# Patient Record
Sex: FEMALE | Race: WHITE | NOT HISPANIC OR LATINO | URBAN - METROPOLITAN AREA
[De-identification: names, ages, dates, MRNs, and addresses within clinical notes are randomized per-mention and may not be internally consistent; named-entity substitution may affect disease eponyms.]

---

## 2021-03-19 ENCOUNTER — OFFICE VISIT (OUTPATIENT)
Dept: URBAN - METROPOLITAN AREA CLINIC 23 | Facility: CLINIC | Age: 66
End: 2021-03-19
Payer: MEDICARE

## 2021-03-19 DIAGNOSIS — Z12.11 SCREENING FOR COLON CANCER: ICD-10-CM

## 2021-03-19 DIAGNOSIS — Z83.71 FAMILY HISTORY OF COLONIC POLYPS: ICD-10-CM

## 2021-03-19 PROCEDURE — 99202 OFFICE O/P NEW SF 15 MIN: CPT | Performed by: INTERNAL MEDICINE

## 2021-03-19 RX ORDER — POLYETHYLENE GLYCOL 3350, SODIUM SULFATE ANHYDROUS, SODIUM BICARBONATE, SODIUM CHLORIDE, POTASSIUM CHLORIDE 236; 22.74; 6.74; 5.86; 2.97 G/4L; G/4L; G/4L; G/4L; G/4L
AS DIRECTED POWDER, FOR SOLUTION ORAL ONCE
Qty: 1 | Refills: 0 | OUTPATIENT
Start: 2021-03-19 | End: 2021-03-20

## 2021-03-19 NOTE — HPI-TODAY'S VISIT:
65-year-old female presented schedule her screening colonoscopy she denies any GI symptoms no nausea no vomiting no abdominal pain. Her father has large precancerous polyp was removed. She had a colonoscopy about 6 years ago. No blood in the stool no nausea no vomiting.

## 2021-05-13 ENCOUNTER — OFFICE VISIT (OUTPATIENT)
Dept: URBAN - METROPOLITAN AREA SURGERY CENTER 15 | Facility: SURGERY CENTER | Age: 66
End: 2021-05-13

## 2021-05-17 ENCOUNTER — OFFICE VISIT (OUTPATIENT)
Dept: URBAN - METROPOLITAN AREA SURGERY CENTER 15 | Facility: SURGERY CENTER | Age: 66
End: 2021-05-17
Payer: MEDICARE

## 2021-05-17 ENCOUNTER — TELEPHONE ENCOUNTER (OUTPATIENT)
Dept: URBAN - METROPOLITAN AREA CLINIC 92 | Facility: CLINIC | Age: 66
End: 2021-05-17

## 2021-05-17 ENCOUNTER — CLAIMS CREATED FROM THE CLAIM WINDOW (OUTPATIENT)
Dept: URBAN - METROPOLITAN AREA CLINIC 4 | Facility: CLINIC | Age: 66
End: 2021-05-17
Payer: MEDICARE

## 2021-05-17 DIAGNOSIS — Z12.11 COLON CANCER SCREENING: ICD-10-CM

## 2021-05-17 DIAGNOSIS — K63.89 STENOSIS OF ILEOCECAL VALVE: ICD-10-CM

## 2021-05-17 DIAGNOSIS — K63.5 BENIGN COLON POLYP: ICD-10-CM

## 2021-05-17 PROCEDURE — 45385 COLONOSCOPY W/LESION REMOVAL: CPT | Performed by: INTERNAL MEDICINE

## 2021-05-17 PROCEDURE — G8907 PT DOC NO EVENTS ON DISCHARG: HCPCS | Performed by: INTERNAL MEDICINE

## 2021-05-17 PROCEDURE — 88305 TISSUE EXAM BY PATHOLOGIST: CPT | Performed by: PATHOLOGY

## 2021-05-17 RX ORDER — LIDOCAINE 50 MG/G
MIX WITH DILTIAZEM 2.5% OINTMENT TOPICAL TWICE A DAY
Qty: 45 GRAM | Refills: 1 | OUTPATIENT
Start: 2021-05-17

## 2022-03-17 ENCOUNTER — TELEPHONE ENCOUNTER (OUTPATIENT)
Dept: URBAN - METROPOLITAN AREA CLINIC 23 | Facility: CLINIC | Age: 67
End: 2022-03-17

## 2024-03-20 ENCOUNTER — OV EP (OUTPATIENT)
Dept: URBAN - METROPOLITAN AREA CLINIC 111 | Facility: CLINIC | Age: 69
End: 2024-03-20
Payer: MEDICARE

## 2024-03-20 VITALS
DIASTOLIC BLOOD PRESSURE: 71 MMHG | BODY MASS INDEX: 24.83 KG/M2 | HEART RATE: 81 BPM | WEIGHT: 149 LBS | SYSTOLIC BLOOD PRESSURE: 101 MMHG | HEIGHT: 65 IN | TEMPERATURE: 97.7 F

## 2024-03-20 DIAGNOSIS — A04.72 C. DIFFICILE DIARRHEA: ICD-10-CM

## 2024-03-20 DIAGNOSIS — D32.9 MENINGIOMA: ICD-10-CM

## 2024-03-20 DIAGNOSIS — Z86.010 HISTORY OF COLON POLYPS: ICD-10-CM

## 2024-03-20 PROCEDURE — 99213 OFFICE O/P EST LOW 20 MIN: CPT | Performed by: INTERNAL MEDICINE

## 2024-03-20 RX ORDER — LIDOCAINE 50 MG/G
MIX WITH DILTIAZEM 2.5% OINTMENT TOPICAL TWICE A DAY
Qty: 45 GRAM | Refills: 1 | Status: ACTIVE | COMMUNITY
Start: 2021-05-17

## 2024-03-20 NOTE — HPI-TODAY'S VISIT:
68-year-old female presents today for follow-up after hospitalization at [facility] in December 22, 2023 for acute onset diarrhea dehydration she was diagnosed with C. difficile and E. coli colitis.  Treated with Flagyl and Dificid.  In the hospital found to have 2.5 cm calcified hemangioma.  She reported she followed with infectious disease Dr. Vasquez her diarrhea improved currently has 2 bowel movement a day she is on probiotic.  She had colonoscopy in 2021 revealed diverticulosis and small tubular adenoma She reports he had a C. diff infection in December, following a UTI in March and an E. coli infection contracted after a trip to [facility] in October.  , the patient notes an improvement in bowel movements, although they have not returned to their previous consistency. Probiotics have been part of her regimen since the hospitalization for C. diff and are still being taken. Additionally, the patient has a 15-year history of a benign meningioma located in the brain's lining. This condition is associated with headaches, but the most recent MRI indicates that the meningioma is calcified and has remained stable.

## 2024-03-20 NOTE — PHYSICAL EXAM GASTROINTESTINAL
Abdomen , soft, nontender, nondistended , no guarding or rigidity , no masses palpable , normal bowel sounds , Liver and Spleen,  no hepatosplenomegaly , liver nontender Writer spoke with Patti in endocrinology.  She said patient was referred to Medical Weight Management.  She said after reviewing, patient has a history swallowing items/self harm and a history of bulemia.  She said there is a note from the dietician with very strict directions to patient about things like not weighing herself, etc.  She said that Maulik just told her dietician the other day that she abuses laxatives and then told the endocrinology office that she doesn't.  She said that they do not feel is it safe for patient to see them for Medical Weight Management at this time.      Writer does not see a recent referral for this.  Patti reviewed patient's referrals and said that patient must have self referred then and that they will likely discharge her.

## 2024-03-22 PROBLEM — 126965008: Status: ACTIVE | Noted: 2024-03-22

## 2025-01-22 ENCOUNTER — OFFICE VISIT (OUTPATIENT)
Dept: URBAN - METROPOLITAN AREA CLINIC 111 | Facility: CLINIC | Age: 70
End: 2025-01-22

## 2025-03-12 ENCOUNTER — DASHBOARD ENCOUNTERS (OUTPATIENT)
Age: 70
End: 2025-03-12

## 2025-03-12 ENCOUNTER — LAB OUTSIDE AN ENCOUNTER (OUTPATIENT)
Dept: URBAN - METROPOLITAN AREA CLINIC 111 | Facility: CLINIC | Age: 70
End: 2025-03-12

## 2025-03-12 ENCOUNTER — OFFICE VISIT (OUTPATIENT)
Dept: URBAN - METROPOLITAN AREA CLINIC 111 | Facility: CLINIC | Age: 70
End: 2025-03-12
Payer: MEDICARE

## 2025-03-12 VITALS
WEIGHT: 149 LBS | SYSTOLIC BLOOD PRESSURE: 104 MMHG | TEMPERATURE: 96.8 F | HEIGHT: 65 IN | BODY MASS INDEX: 24.83 KG/M2 | HEART RATE: 91 BPM | DIASTOLIC BLOOD PRESSURE: 72 MMHG

## 2025-03-12 DIAGNOSIS — J84.10 PULMONARY FIBROSIS: ICD-10-CM

## 2025-03-12 DIAGNOSIS — Z86.0101 HISTORY OF ADENOMATOUS AND SERRATED COLON POLYPS: ICD-10-CM

## 2025-03-12 DIAGNOSIS — D32.9 MENINGIOMA: ICD-10-CM

## 2025-03-12 PROBLEM — 51615001: Status: ACTIVE | Noted: 2025-03-12

## 2025-03-12 PROCEDURE — 99213 OFFICE O/P EST LOW 20 MIN: CPT | Performed by: INTERNAL MEDICINE

## 2025-03-12 RX ORDER — LIDOCAINE 50 MG/G
MIX WITH DILTIAZEM 2.5% OINTMENT TOPICAL TWICE A DAY
Qty: 45 GRAM | Refills: 1 | Status: ACTIVE | COMMUNITY
Start: 2021-05-17

## 2025-03-12 RX ORDER — POLYETHYLENE GLYCOL 3350, SODIUM SULFATE ANHYDROUS, SODIUM BICARBONATE, SODIUM CHLORIDE, POTASSIUM CHLORIDE 236; 22.74; 6.74; 5.86; 2.97 G/4L; G/4L; G/4L; G/4L; G/4L
POWDER, FOR SOLUTION ORAL ONCE
Qty: 1 | Refills: 0 | OUTPATIENT
Start: 2025-03-12 | End: 2025-03-13

## 2025-03-12 RX ORDER — NINTEDANIB 100 MG/1
1 CAPSULE CAPSULE ORAL
Status: ACTIVE | COMMUNITY

## 2025-03-12 NOTE — HPI-TODAY'S VISIT:
69-year-old female presents to schedule surveillance colonoscopy.  She recently  diagnosed with pulmonary fibrosis, early stage - Currently on Ofev 100mg medication twice daily: previously on 150mg twice daily, reduced due to liver issues - GI symptoms: diarrhea, cramping, bloating, nausea - Advised by pulmonologist (Dr. De Guzman) to temporarily discontinue medication if symptoms worsen, then restart - Incidental finding of meningioma on imaging: benign, follow-up scan scheduled for 03/17/2025 - No oxygen requirement currently   she was diagnosed with C. difficile and E. coli colitis in 2024 .  Treated with Flagyl and Dificid.  In the hospital found to have 2.5 cm calcified hemangioma.  She reported she followed with infectious disease Dr. Vasquez her diarrhea improved currently has 2 bowel movement a day she is on probiotic.  She had colonoscopy in 2021 revealed diverticulosis and small tubular adenoma

## 2025-03-20 ENCOUNTER — TELEPHONE ENCOUNTER (OUTPATIENT)
Dept: URBAN - METROPOLITAN AREA CLINIC 111 | Facility: CLINIC | Age: 70
End: 2025-03-20

## 2025-04-29 ENCOUNTER — OFFICE VISIT (OUTPATIENT)
Dept: URBAN - METROPOLITAN AREA MEDICAL CENTER 27 | Facility: MEDICAL CENTER | Age: 70
End: 2025-04-29

## 2025-04-29 RX ORDER — NINTEDANIB 100 MG/1
1 CAPSULE CAPSULE ORAL
Status: ACTIVE | COMMUNITY

## 2025-04-29 RX ORDER — LIDOCAINE 50 MG/G
MIX WITH DILTIAZEM 2.5% OINTMENT TOPICAL TWICE A DAY
Qty: 45 GRAM | Refills: 1 | Status: ACTIVE | COMMUNITY
Start: 2021-05-17